# Patient Record
Sex: FEMALE | Race: WHITE | NOT HISPANIC OR LATINO | URBAN - METROPOLITAN AREA
[De-identification: names, ages, dates, MRNs, and addresses within clinical notes are randomized per-mention and may not be internally consistent; named-entity substitution may affect disease eponyms.]

---

## 2023-01-04 ENCOUNTER — DOCUMENTATION ONLY (OUTPATIENT)
Dept: ADMINISTRATIVE | Facility: OTHER | Age: 43
End: 2023-01-04

## 2023-01-10 NOTE — PROGRESS NOTES
RADIATION ONCOLOGY CONSULTATION  [facility]      NAME: Carri Norris    : 1980 SEX: F MR#: [id number]   DIAGNOSIS: Stage IV breast cancer   REFERRING PHYSICIAN: Yolis Wilkerson M.D.   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  The patient is being seen in consultation for consideration of radiotherapy at the request of Dr. Wilkerson.    HISTORY OF PRESENT ILLNESS:  Of note, the complete medical records pertaining to the patient's initial diagnosis and treatment are incompletely available at today's diagnosis.  In 2014, she was diagnosed with a stage IIIA cT3N1c left breast cancer after self-palpating a left breast mass.  Left breast punch biopsy on 2014, was positive for grade II infiltrating ductal carcinoma without lymphovascular space invasion.  The tumor was estrogen receptor negative, progesterone receptor negative and HER-2/lynette negative, being 0 on immunohistochemistry.  The Ki-67 was 30% positive.  BRCA1 and 2 mutation testing on 2014, negative.     The patient was initially treated in [place] in 2014, with neoadjuvant dose-dense AC chemotherapy times four cycles, followed by four cycles of Taxol.  She subsequently underwent a lumpectomy and sentinel lymph node biopsy with the pathology being unavailable at today's consultation, with the patient reporting multiple positive  nodes at that time.  She also reports receiving postoperative external beam radiation therapy over approximately 30 fractions in [place], after which she was treated with anastrozole and Lupron.     She subsequently moved to [place] and in November of 2017, complained of right hip pain with an abnormal MRI reportedly demonstrating multifocal bone lesions, including a 2 cm left posterior ilium lesion.  Right iliac bone biopsy on 11/27/2017, was positive for metastatic carcinoma which was estrogen receptor positive, progesterone receptor negative and HER-2/lynette negative, being 0 on immunohistochemistry.     The patient was seen by Medical Oncology on 11/30/2017, at which time she was referred to Radiation Oncology, and from 12/14/2017 to 12/28/2017, was treated with palliative external beam radiation therapy to the right iliac bone and hip in 10 of 300 cGy fractions to a dose of 3000 cGy by Dr. Thomas.  The patient reports having a complete pain response to treatment at that time and was subsequently treated with Ibrance, letrozole, Lupron and Xgeva.     PET scan on 11/18/2020, showed a new hypermetabolic focus in the posterior lower pelvis superficial to the gluteus saulo muscle.  There was stable left maxillary hypermetabolic uptake.  There were stable left breast postoperative and postradiation changes.  She saw Dr. Headley of Medical Oncology on 01/21/2022, who recommended continuation of Ibrance, letrozole, Lupron and Xgeva.     The patient reports that she subsequently moved to [place], at which time she lost her insurance and discontinued palliative systemic therapy altogether.  She saw Dr. Wilkerson on 10/17/2022, who noted the patient had been off treatment for one to one and half years, and that she was currently complaining of right shoulder and back pain for the past month.  The recommendation was made for blood work and PET scan, and the recommendation was to start Femara, Kisqali and tramadol.      PET scan on 10/21/2022, showed interval development compared to November 2020, predominantly lytic bone metastases, including the right frontal bone, right C2, sternal bone rim and sternal body, multiple right-sided ribs, T-spine vertebral bodies, right C1 and C4 vertebral bodies, left T12 rib, L2 vertebral body, left sacral ala, left iliac and T12 vertebral body.     She subsequently started Kisqali  with the dose reduced for cycle #2 on 12/02/2022.  She saw Dr. Wilkerson on 12/20/2022, who recommended continuation of Lupron, Kisqali, Xgeva, Femara, calcium and vitamin D.  MRIs of the spine were noted to have been ordered and were currently pending.     She saw Dr. Wilkerson again on 12/30/2022, at which the patient was complaining of increased back pain with movement, felt to likely be consistent with her T12 vertebral body metastasis.  The MRIs had still not been done at that time.  The recommendation was made to repeat a PET scan in late January and referred the patient to Radiation Oncology for consideration of T-spine radiation.    REVIEW OF SYSTEMS:  The patient's primary complaint is mid back pain since October 2022, which she states is worse with movement.  She denies any other sites of pain.  She denies any focal numbness, tingling, weakness, or bowel or bladder incontinence.  Her only other complaint is intermittent nausea with chemotherapy.  She denies any high fevers, shaking chills, drenching night sweats or recent weight loss.     She denies any recent changes in vision, hearing or swallowing, shortness of breath at rest, dyspnea on exertion, cough, chest pain, abdominal pain, vomiting, constipation, diarrhea, bright-red blood per rectum or urinary symptoms.  She denies any severe headaches, diplopia or ataxia.  All other systems reviewed and negative.    PAST MEDICAL HISTORY:  Stage IV breast cancer as above.    PAST SURGICAL HISTORY:  Status post lumpectomy and sentinel lymph node biopsy as above,  status post Port-A-Cath placement and removal.    PAST GYNECOLOGIC HISTORY:  .  Menarche at the age of 15.  First pregnancy and breast-feeding, not applicable.  The patient is premenopausal.  Oral contraceptive use, none.  Hormone replacement therapy, not applicable.    PAST RADIATION THERAPY:  External beam radiation therapy to the left breast in [place] in .  Palliative external beam radiation therapy to the right pelvis at 3000 cGy, completed on 2017 in [place].    MEDICATIONS:  Femara, Kisquali, Zofran p.r.n., promethazine, vitamin D.    ALLERGIES:  No known drug allergies.    FAMILY HISTORY:  Paternal grandmother with breast cancer diagnosed in her 60s.  No family history of ovarian malignancy.    SOCIAL HISTORY:  The patient does not smoke, drink alcohol and denies illicit drug use. She lives in [place], is  and has no children.  She works as an .    PHYSICAL EXAMINATION:  VITAL SIGNS:  Blood pressure 105/76, temperature 97.7, heart rate 84, weight 98 pounds, height 5 feet 5 inches, oxygen saturation 98% on room air.  ECOG score of 1.   GENERAL:  The patient is a pleasant well-nourished, well-developed thin  female in no acute distress.   HEENT:  Normocephalic, atraumatic.  Extraocular muscles intact.  Pupils equally round and reactive to light.  Sclerae anicteric.  Oral cavity and oropharynx are clear without erythema, exudate, masses or ulcerations.   NECK:  Supple without lymphadenopathy or thyromegaly.   HEART:  Regular rate and rhythm.  Normal S1, S2.  No murmurs, gallops or rubs.     LUNGS:  Clear to auscultation bilaterally.  No wheezes, rhonchi or rales.   BACK:  No cervical, thoracic, lumbar or sacral tenderness to percussion.  No costovertebral angle tenderness.   ABDOMEN:  Soft, nontender and nondistended.  No masses or hepatosplenomegaly.   EXTREMITIES:  Warm and well perfused.  No clubbing, cyanosis or edema.   NEUROLOGIC:  Cranial nerves two through  12 grossly intact.  Motor and sensory intact bilaterally.  Finger-nose-finger and gait within normal limits.  Patellar deep tendon reflexes bilaterally.  No clonus.    ASSESSMENT AND PLAN:  The patient is a 42-year-old premenopausal female with history of a [id number] left breast infiltrating ductal carcinoma (ER negative, OK negative, HER-2/lynette negative), status post neoadjuvant chemotherapy, followed by lumpectomy and sentinel lymph node biopsy, external beam radiation therapy in 2014, who later presented in 2017 with biopsy positive bone metastases, treated with palliative external beam radiation therapy, followed by palliative systemic therapy, self-discontinued in 2021, who now presents with symptomatic disease progression, including painful T-spine vertebral body bone metastases, currently receiving systemic Kisqali, Femara and Lupron.     Based upon this patient's history and presentation, I believe that she is likely an appropriate candidate for external beam radiation therapy.  The patient understands that the mainstay of her future therapy will continue to be palliative systemic therapy given her stage IV diagnosis.  She is aware of the need to continue to follow up with Medical Oncology lifelong as her disease progressed after she discontinued treatment in 2021.     I explained to the patient that the role of external beam radiation therapy in this setting is palliative alone with the goal of improving quality of life and reducing the risk of future complications.  In Ms. Norris' particular case, I do feel that her recent worsening of back pain is likely secondary to the thoracic vertebral body bone metastases, most likely T12, however, there is also an avid T-spine metastasis which may also be the source of the pain or partially contributing to it.  I explained to her that the goal of external beam radiation therapy is to provide pain palliation and reduce the risk of future worsening of the pain.      In  addition, radiation should reduce the future risk of pathologic fracture and/or spinal cord compression, of which there is no evidence of such at this time.  MRIs of the spine were ordered on 11/07/2022 and again on 12/20/2022, and have not yet occurred for unclear reasons.  I plan to reach out to [facility] in order to expedite the MRI imaging as it will likely help delineate radiation treatment field, and as the patient states that her pain is currently relatively well controlled would prefer to wait until all of the imaging is available prior to starting treatment.     The risks, benefits, side effects, alternatives to, indications for and mechanisms of external beam radiation therapy were explained in full to the patient.  She asked multiple appropriate questions, all of which were answered to her apparent satisfaction.  This conversation included discussion of possible short-term side effects, including but not limited to dermatitis, local alopecia, nausea, vomiting, dysphagia, odynophagia, abdominal cramping, and transient worsening of pain and fatigue.  We also discussed the possible long-term side effects, including but not limited to residual hyperpigmentation and/or alopecia, very low risk of esophageal and/or gastric ulceration, low risk of damage to the nerves, spinal cord and/or small bowel, heart and/or lungs.  The patient agreed with the proposed treatment plan and informed consent was obtained.     I will continue to follow Ms. Norris as she undergoes upcoming MRI imaging of the spine.  In the meantime, she has been scheduled to return to [facility] to undergo CT-guided simulation in the supine treatment position with the use of a wing board and Vac-Bon for custom immobilization later next week.  After fusion with the previous PET scan, a customized treatment plan will be designed, after which she would undergo verification films and initiate an anticipated  two-week course of palliative 3D conformal radiotherapy to the involved spine.     Thank you very much, Dr. Wilkerson, for this consultation and the opportunity to participate in the care of this patient.  Please do not hesitate to contact me should you have any questions, concerns and/or require additional information.      ADDENDUM:  The patient has been rescheduled for MRI of the C, T and L-spine with and without gadolinium at [facility] [place] [facility] on 01/13/2023.        KATHY Nichole MD